# Patient Record
Sex: FEMALE | Race: BLACK OR AFRICAN AMERICAN | Employment: FULL TIME | URBAN - METROPOLITAN AREA
[De-identification: names, ages, dates, MRNs, and addresses within clinical notes are randomized per-mention and may not be internally consistent; named-entity substitution may affect disease eponyms.]

---

## 2017-03-01 ENCOUNTER — APPOINTMENT (OUTPATIENT)
Dept: GENERAL RADIOLOGY | Facility: HOSPITAL | Age: 32
End: 2017-03-01
Payer: OTHER MISCELLANEOUS

## 2017-03-01 ENCOUNTER — HOSPITAL ENCOUNTER (EMERGENCY)
Facility: HOSPITAL | Age: 32
Discharge: HOME | End: 2017-03-01
Payer: OTHER MISCELLANEOUS

## 2017-03-01 VITALS
WEIGHT: 130 LBS | SYSTOLIC BLOOD PRESSURE: 123 MMHG | HEART RATE: 85 BPM | DIASTOLIC BLOOD PRESSURE: 94 MMHG | HEIGHT: 62 IN | BODY MASS INDEX: 23.92 KG/M2 | OXYGEN SATURATION: 99 % | TEMPERATURE: 98 F | RESPIRATION RATE: 16 BRPM
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
## 2017-03-01 DIAGNOSIS — S40.011A CONTUSION OF RIGHT SHOULDER, INITIAL ENCOUNTER: Primary | ICD-10-CM

## 2017-03-01 PROCEDURE — 73030 X-RAY EXAM OF SHOULDER: CPT

## 2017-03-01 PROCEDURE — 99283 EMERGENCY DEPT VISIT LOW MDM: CPT

## 2017-03-01 RX ORDER — IBUPROFEN 600 MG/1
600 TABLET ORAL ONCE
Status: COMPLETED | OUTPATIENT
Start: 2017-03-01 | End: 2017-03-01

## 2017-03-01 NOTE — ED NOTES
PT safe to DC home per MD. [place] to dress self. DC teaching done, pt verbalizes understanding. Ambulatory with steady gait to exit.

## 2017-03-01 NOTE — ED INITIAL ASSESSMENT (HPI)
Pt states that a door hit hr in the upper right back while at work last night. Reports right shoulder pain.  And pain with movement

## 2017-03-01 NOTE — ED PROVIDER NOTES
Patient Seen in: [facility] AND [facility] Emergency Department    History   Patient presents with:  Musculoskeletal Problem    Stated Complaint: shoulder pain    HPI    45-year-old female presents to the emergency department complaining of pain to her right upp Pulmonary/Chest: Effort normal. No respiratory distress. Abdominal: Soft. Bowel sounds are normal. There is no tenderness.    Musculoskeletal:        Right shoulder: She exhibits decreased range of motion (pain with all range of motion, increase with exte

## (undated) NOTE — ED AVS SNAPSHOT
[facility] Emergency Department    [address]     [place] [address]    Phone:  [phone]    Fax:  [phone]           Deandre Meza   MRN: [id number]    Department:  [facility] Emergency Department   Date of Visit:  3/1/201 If you have difficulty scheduling your follow-up appointment as directed, please call our  at [phone]. Si tiene problemas para programar sera shira de seguimiento según lo indicado, llame al encargado de ruy al [phone].     It i continue to take your medications as instructed by your Primary Care doctor until you can check with your doctor. Please bring the medication list to your next doctor's appointment.     Any imaging studies and labs completed today can be reviewed in your [place] plans. To get signed up and covered, please call [phone] and ask to get set up for an insurance coverage that is in-network with [facility] [employer]     Sign up for [employer], your secure online medical record.   [employer]

## (undated) NOTE — ED AVS SNAPSHOT
[facility] Emergency Department    [address]     [place] [address]    Phone:  [phone]    Fax:  [phone]           Gena Frances   MRN: [id number]    Department:  [facility] Emergency Department   Date of Visit:  3/1/201 and Class Registration line at [phone] or find a doctor online by visiting [email or web address].    IF THERE IS ANY CHANGE OR WORSENING OF YOUR CONDITION, CALL YOUR PRIMARY CARE PHYSICIAN AT ONCE OR RETURN IMMEDIATELY TO [address] [place] [address].     If